# Patient Record
Sex: MALE | Race: WHITE | NOT HISPANIC OR LATINO
[De-identification: names, ages, dates, MRNs, and addresses within clinical notes are randomized per-mention and may not be internally consistent; named-entity substitution may affect disease eponyms.]

---

## 2022-12-29 ENCOUNTER — TRANSCRIPTION ENCOUNTER (OUTPATIENT)
Age: 52
End: 2022-12-29

## 2022-12-29 ENCOUNTER — APPOINTMENT (OUTPATIENT)
Dept: CARDIOLOGY | Facility: CLINIC | Age: 52
End: 2022-12-29

## 2022-12-29 VITALS
BODY MASS INDEX: 38.07 KG/M2 | SYSTOLIC BLOOD PRESSURE: 110 MMHG | RESPIRATION RATE: 16 BRPM | OXYGEN SATURATION: 98 % | DIASTOLIC BLOOD PRESSURE: 70 MMHG | WEIGHT: 223 LBS | HEIGHT: 64 IN | HEART RATE: 83 BPM

## 2022-12-29 DIAGNOSIS — Z78.9 OTHER SPECIFIED HEALTH STATUS: ICD-10-CM

## 2022-12-29 DIAGNOSIS — Z72.3 LACK OF PHYSICAL EXERCISE: ICD-10-CM

## 2022-12-29 DIAGNOSIS — Q04.6 CONGENITAL CEREBRAL CYSTS: ICD-10-CM

## 2022-12-29 DIAGNOSIS — Z82.49 FAMILY HISTORY OF ISCHEMIC HEART DISEASE AND OTHER DISEASES OF THE CIRCULATORY SYSTEM: ICD-10-CM

## 2022-12-29 PROCEDURE — 99204 OFFICE O/P NEW MOD 45 MIN: CPT | Mod: 25

## 2022-12-29 PROCEDURE — 93000 ELECTROCARDIOGRAM COMPLETE: CPT

## 2022-12-29 RX ORDER — MULTIVITAMIN
TABLET ORAL DAILY
Refills: 0 | Status: ACTIVE | COMMUNITY

## 2022-12-29 NOTE — ASSESSMENT
[FreeTextEntry1] : ECG: Normal sinus rhythm at 83 bpm.  No significant ST-T wave changes.\par \par Laboratory data:\par ---4/15/2022\par Chol---139\par HDL---27\par LDL---65\par Trigs---235\par S7x---1.6\par \par Impression\par 1.  Mixed hyperlipidemia, consistent with his metabolic syndrome.  Triglyceridemia control possibly inadequate.\par \par 2.  Hypertension of about 10 years duration seems adequately controlled\par \par 3.  Diabetes mellitus: Reasonably well-controlled\par \par 4.  Obstructive sleep apnea on CPAP fairly consistently but no pulmonary follow-up in quite some time\par \par 5.  Obesity with BMI 38\par \par Plan:\par 1.  Instructed the patient about the benefits of a diet that restricts portion sizes, increases frequency of meals and consists of  vegetables, (more green and leafy),fruit and nuts, whole grains, lean proteins and limits carbohydrates and meat and dairy fats\par \par 2.  The patient was instructed to follow a symptom limited regimen of moderate aerobic exercise for 30 minutes 3 to 4 days a week. A warm up and cool down period are to be added to the regimen and small incremental changes can be made every few weeks. Any new symptoms of exercise related chest pain or dyspnea should be reported.\par \par 3.  Echocardiogram to rule out structural and functional heart disease\par \par 4.  Exercise stress test to rule stratify\par \par 5.  CT calcium scoring to further assess and address risk.\par \par 6.  Pulmonary-sleep medicine follow-up.

## 2022-12-29 NOTE — REASON FOR VISIT
[FreeTextEntry1] : 52-year-old male presents here for cardiac evaluation with concerns about:\par 1.  Hypertension\par 2.  Mixed hyperlipidemia\par 3.  Morbid obesity\par 4.  Obstructive sleep apnea.\par \par Does not exercise regularly.\par Experiences no significant shortness of breath, chest pain, palpitations, PND, orthopnea, edema.\par \par He is a remote smoker.\par Denies diabetes.\par Equivocally positive family history Mom and father both  of cardiac etiologies but details are unclear.\par \par Uses his CPAP regularly.  Has not had any pulmonary sleep study follow-up.\par \par Admits to dietary indiscretions and difficulty with weight loss.\par \par Cardiac evaluation performed in 2016 was largely unremarkable.

## 2022-12-29 NOTE — PHYSICAL EXAM
[Well Nourished] : well nourished [No Acute Distress] : no acute distress [Obese] : obese [Soft] : abdomen soft [Non Tender] : non-tender [Normal] : alert and oriented, normal memory [de-identified] : Normocephalic/atraumatic

## 2023-01-06 ENCOUNTER — APPOINTMENT (OUTPATIENT)
Dept: CT IMAGING | Facility: CLINIC | Age: 53
End: 2023-01-06
Payer: SELF-PAY

## 2023-01-06 ENCOUNTER — OUTPATIENT (OUTPATIENT)
Dept: OUTPATIENT SERVICES | Facility: HOSPITAL | Age: 53
LOS: 1 days | End: 2023-01-06
Payer: SELF-PAY

## 2023-01-06 DIAGNOSIS — E66.9 OBESITY, UNSPECIFIED: ICD-10-CM

## 2023-01-06 PROCEDURE — 75571 CT HRT W/O DYE W/CA TEST: CPT | Mod: 26

## 2023-01-06 PROCEDURE — 75571 CT HRT W/O DYE W/CA TEST: CPT

## 2023-01-09 ENCOUNTER — TRANSCRIPTION ENCOUNTER (OUTPATIENT)
Age: 53
End: 2023-01-09

## 2023-01-09 ENCOUNTER — NON-APPOINTMENT (OUTPATIENT)
Age: 53
End: 2023-01-09

## 2023-01-26 ENCOUNTER — APPOINTMENT (OUTPATIENT)
Dept: PULMONOLOGY | Facility: CLINIC | Age: 53
End: 2023-01-26
Payer: COMMERCIAL

## 2023-01-26 VITALS
DIASTOLIC BLOOD PRESSURE: 80 MMHG | SYSTOLIC BLOOD PRESSURE: 120 MMHG | RESPIRATION RATE: 16 BRPM | HEART RATE: 86 BPM | OXYGEN SATURATION: 96 %

## 2023-01-26 VITALS — WEIGHT: 225 LBS | BODY MASS INDEX: 38.62 KG/M2

## 2023-01-26 PROCEDURE — 99204 OFFICE O/P NEW MOD 45 MIN: CPT

## 2023-01-26 NOTE — CONSULT LETTER
[Dear  ___] : Dear  [unfilled], [Consult Letter:] : I had the pleasure of evaluating your patient, [unfilled]. [Please see my note below.] : Please see my note below. [Consult Closing:] : Thank you very much for allowing me to participate in the care of this patient.  If you have any questions, please do not hesitate to contact me. [Sincerely,] : Sincerely, [FreeTextEntry3] : Luis Angel Ha MD FCCP\par Pulmonary/Critical Care/Sleep Medicine\par Department of Internal Medicine\par \par [facility] [employer] [facility]\par

## 2023-01-26 NOTE — HISTORY OF PRESENT ILLNESS
[Awakes Unrefreshed] : awakes unrefreshed [Awakes with Dry Mouth] : awakes with dry mouth [Awakes with Headache] : awakes with headache [Snoring] : snoring [Witnessed Apneas] : witnessed apneas [Lab] : lab [Full Face mask] : full face mask [Obstructive Sleep Apnea] : obstructive sleep apnea [TextBox_77] : 4832 [TextBox_79] : 9281 [TextBox_81] : 5 [TextBox_89] : 0 [TextBox_93] : Above without CPAP [TextBox_100] : 2/18/2016 [TextBox_108] : 16 (REM 38) [TextBox_120] : CPAP 9 [TextBox_104] : 3/29/2016 [TextBox_160] : [employer] Large [TextBox_165] : Machine greater than 5 years old and requires replacement\par Historically 100% compliant.  No compliance record is available [TextBox_162] : 2016 [ESS] : 9

## 2023-01-26 NOTE — END OF VISIT
[FreeTextEntry3] : Chart review [Time Spent: ___ minutes] : I have spent [unfilled] minutes of time on the encounter.

## 2023-01-26 NOTE — DISCUSSION/SUMMARY
[Obstructive Sleep Apnea] : obstructive sleep apnea [Moderate] : moderate in severity [None] : There are no changes in medication management [Alcohol Avoidance] : alcohol avoidance [Sedative Avoidance] : sedative avoidance [Weight Loss Program] : weight loss program [CPAP] : CPAP [de-identified] : Machine > 5yrs old. Needs replacement

## 2023-01-31 ENCOUNTER — APPOINTMENT (OUTPATIENT)
Dept: CARDIOLOGY | Facility: CLINIC | Age: 53
End: 2023-01-31
Payer: COMMERCIAL

## 2023-01-31 PROCEDURE — 93015 CV STRESS TEST SUPVJ I&R: CPT

## 2023-02-08 ENCOUNTER — APPOINTMENT (OUTPATIENT)
Dept: CARDIOLOGY | Facility: CLINIC | Age: 53
End: 2023-02-08
Payer: COMMERCIAL

## 2023-02-08 PROCEDURE — 93306 TTE W/DOPPLER COMPLETE: CPT

## 2023-02-08 RX ORDER — PERFLUTREN 6.52 MG/ML
6.52 INJECTION, SUSPENSION INTRAVENOUS
Qty: 2 | Refills: 0 | Status: COMPLETED | OUTPATIENT
Start: 2023-02-08

## 2023-02-08 RX ADMIN — PERFLUTREN MG/ML: 6.52 INJECTION, SUSPENSION INTRAVENOUS at 00:00

## 2023-02-09 ENCOUNTER — APPOINTMENT (OUTPATIENT)
Dept: CARDIOLOGY | Facility: CLINIC | Age: 53
End: 2023-02-09
Payer: COMMERCIAL

## 2023-02-09 VITALS
HEIGHT: 64 IN | DIASTOLIC BLOOD PRESSURE: 70 MMHG | BODY MASS INDEX: 38.41 KG/M2 | SYSTOLIC BLOOD PRESSURE: 112 MMHG | RESPIRATION RATE: 16 BRPM | WEIGHT: 225 LBS | OXYGEN SATURATION: 97 % | HEART RATE: 86 BPM

## 2023-02-09 PROCEDURE — 99214 OFFICE O/P EST MOD 30 MIN: CPT | Mod: 25

## 2023-02-09 PROCEDURE — 93000 ELECTROCARDIOGRAM COMPLETE: CPT

## 2023-02-09 NOTE — ASSESSMENT
[FreeTextEntry1] : Laboratory data:\par ------4/15/22---1/10/23\par Chol---139--------148\par HDL----27----------31\par LDL----65----------81\par Trigs---235-------180\par V7f---2.6\par Creat--------------1.39\par \par CT calcium score 1/6/2023:\par Total:-9 all in the LAD.  (64th percentile)\par \par Echocardiogram 2/8/2023:\par Mild concentric LVH with hyperdynamic function LVEF-65 to 70%\par No other significant abnormalities noted.\par \par Stress test 1/31/2023:\par Time: 7 minutes 30 seconds\par Heart rate: 166 bpm (99%)\par Blood pressure: 184/72\par ECG: No ischemia occasional PVCs\par Cantor score: 7 consistent with low risk\par \par Impression\par 1.  Mixed hyperlipidemia, consistent with his metabolic syndrome.  Triglyceridemia control possibly inadequate.\par \par 2.  Hypertension of about 10 years duration seems adequately controlled with borderline LVH on echo and normal systolic function\par \par 3.  Diabetes mellitus: Reasonably well-controlled\par \par 4.  Obstructive sleep apnea on CPAP fairly consistently\par \par 5.  Obesity with BMI 38\par \par 6.  Mildly diminished exercise tolerance with out any inducible ischemia.\par \par 7.  Elevated serum creatinine noted on recent lab work.\par \par 8.  Low total cardiac calcium score implications reviewed.\par \par Plan:\par 1.  Instructed the patient about the benefits of a diet that restricts portion sizes, increases frequency of meals and consists of  vegetables, (more green and leafy),fruit and nuts, whole grains, lean proteins and limits carbohydrates and meat and dairy fats\par \par 2.  The patient was instructed to follow a symptom limited regimen of moderate aerobic exercise for 30 minutes 3 to 4 days a week. A warm up and cool down period are to be added to the regimen and small incremental changes can be made every few weeks. Any new symptoms of exercise related chest pain or dyspnea should be reported.\par \par 3.  Follow-up metabolic panel lipids and A1c with primary care.\par

## 2023-02-09 NOTE — REASON FOR VISIT
[FreeTextEntry1] : 52-year-old male presents here for cardiac evaluation with concerns about:\par \par 1.  Hypertension\par 2.  Mixed hyperlipidemia\par 3.  Morbid obesity\par 4.  Obstructive sleep apnea.\par \par Does not exercise regularly.\par Experiences no significant shortness of breath, chest pain, palpitations, PND, orthopnea, edema.\par \par He is a remote smoker.\par Denies diabetes.\par Equivocally positive family history Mom and father both  of cardiac etiologies but details are unclear.\par \par Uses his CPAP regularly.  Has  had  pulmonary sleep study follow-up and is awaiting delivery of a new CPAP mask\par \par Admits to dietary indiscretions and difficulty with weight loss.\par \par Cardiac evaluation performed in  was largely unremarkable.

## 2023-02-09 NOTE — PHYSICAL EXAM
[Well Nourished] : well nourished [No Acute Distress] : no acute distress [Obese] : obese [Soft] : abdomen soft [Non Tender] : non-tender [Normal] : alert and oriented, normal memory [de-identified] : Normocephalic/atraumatic

## 2023-04-27 ENCOUNTER — APPOINTMENT (OUTPATIENT)
Dept: PULMONOLOGY | Facility: CLINIC | Age: 53
End: 2023-04-27
Payer: COMMERCIAL

## 2023-04-27 VITALS
HEIGHT: 64 IN | OXYGEN SATURATION: 98 % | WEIGHT: 220 LBS | HEART RATE: 78 BPM | SYSTOLIC BLOOD PRESSURE: 120 MMHG | DIASTOLIC BLOOD PRESSURE: 72 MMHG | BODY MASS INDEX: 37.56 KG/M2 | RESPIRATION RATE: 16 BRPM

## 2023-04-27 DIAGNOSIS — Z71.89 OTHER SPECIFIED COUNSELING: ICD-10-CM

## 2023-04-27 PROCEDURE — 99214 OFFICE O/P EST MOD 30 MIN: CPT

## 2023-04-27 NOTE — DISCUSSION/SUMMARY
[Obstructive Sleep Apnea] : obstructive sleep apnea [Moderate] : moderate in severity [None] : There are no changes in medication management [Alcohol Avoidance] : alcohol avoidance [Sedative Avoidance] : sedative avoidance [Weight Loss Program] : weight loss program [CPAP] : CPAP [Responding to Treatment] : responding to treatment [de-identified] : Patient compliant with CPAP/BiPAP and benefiting from therapy

## 2023-04-27 NOTE — END OF VISIT
[Time Spent: ___ minutes] : I have spent [unfilled] minutes of time on the encounter. [FreeTextEntry3] : Chart review

## 2023-04-27 NOTE — HISTORY OF PRESENT ILLNESS
[Obstructive Sleep Apnea] : obstructive sleep apnea [Lab] : lab [Full Face mask] : full face mask [Awakes Unrefreshed] : does not awaken unrefreshed [Awakes with Dry Mouth] : does not awaken with dry mouth [Awakes with Headache] : does not awaken with headache [Snoring] : no snoring [Witnessed Apneas] : no witnessed apneas [TextBox_77] : 7137 [TextBox_79] : 0630 [TextBox_81] : 5 [TextBox_89] : 0 [TextBox_93] : Above without CPAP [TextBox_100] : 2/18/2016 [TextBox_108] : 16 (REM 38) [TextBox_120] : CPAP 9 [TextBox_104] : 3/29/2016 [TextBox_127] : 3/28/23 [TextBox_133] : 93 [TextBox_129] : 4/26/2023 [TextBox_137] : 90 [TextBox_141] : 7 [TextBox_143] : 12 [TextBox_147] : 1.7 [TextBox_158] : [employer] [TextBox_160] : F20 Large [TextBox_162] : 1/26/2023 [TextBox_165] : P95%: 14.7 cm H20 [ESS] : 3

## 2023-04-27 NOTE — CONSULT LETTER
[Dear  ___] : Dear  [unfilled], [Consult Letter:] : I had the pleasure of evaluating your patient, [unfilled]. [Please see my note below.] : Please see my note below. [Consult Closing:] : Thank you very much for allowing me to participate in the care of this patient.  If you have any questions, please do not hesitate to contact me. [Sincerely,] : Sincerely, [FreeTextEntry3] : Luis Angel Ha MD FCCP\par Pulmonary/Critical Care/Sleep Medicine\par Department of Internal Medicine\par \par [facility] [employer] [facility]\par

## 2023-08-08 ENCOUNTER — APPOINTMENT (OUTPATIENT)
Dept: CARDIOLOGY | Facility: CLINIC | Age: 53
End: 2023-08-08
Payer: COMMERCIAL

## 2023-08-08 VITALS
OXYGEN SATURATION: 97 % | HEART RATE: 70 BPM | DIASTOLIC BLOOD PRESSURE: 78 MMHG | BODY MASS INDEX: 39.09 KG/M2 | HEIGHT: 64 IN | WEIGHT: 229 LBS | RESPIRATION RATE: 16 BRPM | SYSTOLIC BLOOD PRESSURE: 120 MMHG

## 2023-08-08 DIAGNOSIS — R79.89 OTHER SPECIFIED ABNORMAL FINDINGS OF BLOOD CHEMISTRY: ICD-10-CM

## 2023-08-08 PROCEDURE — 99214 OFFICE O/P EST MOD 30 MIN: CPT | Mod: 25

## 2023-08-08 PROCEDURE — 93000 ELECTROCARDIOGRAM COMPLETE: CPT

## 2023-08-08 RX ORDER — FENOFIBRATE 145 MG/1
145 TABLET, COATED ORAL DAILY
Qty: 90 | Refills: 3 | Status: ACTIVE | COMMUNITY
Start: 1900-01-01 | End: 1900-01-01

## 2023-08-08 NOTE — PHYSICAL EXAM
[Well Nourished] : well nourished [No Acute Distress] : no acute distress [Obese] : obese [Soft] : abdomen soft [Non Tender] : non-tender [Normal] : alert and oriented, normal memory [de-identified] : Normocephalic/atraumatic

## 2023-08-08 NOTE — ASSESSMENT
[FreeTextEntry1] : ECG: Normal sinus rhythm at 70 bpm.  Normal axis and intervals no significant ST or T wave changes  Laboratory data: ------4/15/22---1/10/23--5/2/23 Chol---139--------148-----126 HDL----27----------31------29 LDL----65----------81------49 Trigs---235-------180-----239 O1l---5.6 Creat--------------1.39----1.21  CT calcium score 1/6/2023: Total:-9 all in the LAD.  (64th percentile)  Echocardiogram 2/8/2023: Mild concentric LVH with hyperdynamic function LVEF-65 to 70% No other significant abnormalities noted.  Stress test 1/31/2023: Time: 7 minutes 30 seconds Heart rate: 166 bpm (99%) Blood pressure: 184/72 ECG: No ischemia occasional PVCs Cantor score: 7 consistent with low risk  Impression 1.  Mixed hyperlipidemia, consistent with his metabolic syndrome.  Triglyceridemia control possibly inadequate.  2.  Hypertension of about 10 years duration seems adequately controlled with borderline LVH on echo and normal systolic function  3.  Diabetes mellitus: Reasonably well-controlled  4.  Obstructive sleep apnea using new CPAP fairly consistently  5.  Obesity with increase of 9 pounds with a BMI of 39.3  6.  Reportedly with improvement in exercise tolerance and diminished exertional dyspnea  7.  Serum creatinine is stable  8.  Low total cardiac calcium score implications reviewed.  Plan: 1.  Instructed the patient about the benefits of a diet that restricts portion sizes, increases frequency of meals and consists of  vegetables, (more green and leafy),fruit and nuts, whole grains, lean proteins and limits carbohydrates and meat and dairy fats  2.  The patient was instructed to follow a symptom limited regimen of moderate aerobic exercise for 30 minutes 3 to 4 days a week. A warm up and cool down period are to be added to the regimen and small incremental changes can be made every few weeks. Any new symptoms of exercise related chest pain or dyspnea should be reported.  3.  Follow-up metabolic panel lipids and A1c with primary care.

## 2023-08-08 NOTE — REASON FOR VISIT
[FreeTextEntry1] : 53-year-old male presents here for cardiac evaluation with concerns about:  1.  Hypertension 2.  Mixed hyperlipidemia 3.  Morbid obesity 4.  Obstructive sleep apnea.  Is now exercising regularly both resistance and aerobically and feeling much improved Experiences no significant shortness of breath, chest pain, palpitations, PND, orthopnea, edema.  He is a remote smoker. Denies diabetes. Equivocally positive family history Mom and father both  of cardiac etiologies but details are unclear.  Uses his CPAP regularly.  Has  had  pulmonary sleep study follow-up and is awaiting delivery of a new CPAP mask  Diet is improving but still has dietary indiscretions. Has not lost weight but because of his hardcore resistance workouts he is probably gaining muscle weight and losing fat weight  Cardiac evaluation performed in 2016 was largely unremarkable.

## 2024-02-06 ENCOUNTER — APPOINTMENT (OUTPATIENT)
Dept: CARDIOLOGY | Facility: CLINIC | Age: 54
End: 2024-02-06
Payer: COMMERCIAL

## 2024-02-06 ENCOUNTER — NON-APPOINTMENT (OUTPATIENT)
Age: 54
End: 2024-02-06

## 2024-02-06 VITALS
BODY MASS INDEX: 38.58 KG/M2 | HEIGHT: 64 IN | OXYGEN SATURATION: 98 % | WEIGHT: 226 LBS | DIASTOLIC BLOOD PRESSURE: 70 MMHG | HEART RATE: 75 BPM | SYSTOLIC BLOOD PRESSURE: 110 MMHG | RESPIRATION RATE: 16 BRPM

## 2024-02-06 DIAGNOSIS — E66.9 OBESITY, UNSPECIFIED: ICD-10-CM

## 2024-02-06 DIAGNOSIS — E78.5 HYPERLIPIDEMIA, UNSPECIFIED: ICD-10-CM

## 2024-02-06 DIAGNOSIS — G47.33 OBSTRUCTIVE SLEEP APNEA (ADULT) (PEDIATRIC): ICD-10-CM

## 2024-02-06 DIAGNOSIS — E78.1 PURE HYPERGLYCERIDEMIA: ICD-10-CM

## 2024-02-06 DIAGNOSIS — I10 ESSENTIAL (PRIMARY) HYPERTENSION: ICD-10-CM

## 2024-02-06 PROCEDURE — 99214 OFFICE O/P EST MOD 30 MIN: CPT

## 2024-02-06 PROCEDURE — G2211 COMPLEX E/M VISIT ADD ON: CPT

## 2024-02-06 PROCEDURE — 93000 ELECTROCARDIOGRAM COMPLETE: CPT

## 2024-02-06 NOTE — REASON FOR VISIT
[FreeTextEntry1] : 53-year-old male presents here for cardiac evaluation with concerns about:  1.  Hypertension 2.  Mixed hyperlipidemia 3.  Morbid obesity 4.  Obstructive sleep apnea.  Is now exercising regularly both resistance and aerobically and feeling much improved Experiences no significant shortness of breath, chest pain, palpitations, PND, orthopnea, edema.  He is a remote smoker. Denies diabetes. Equivocally positive family history Mom and father both  of cardiac etiologies but details are unclear.  Uses his CPAP regularly.  Has had pulmonary sleep study follow-up and is awaiting delivery of a new CPAP mask  Diet is improving but still has dietary indiscretions. Has not lost weight but because of his hardcore resistance workouts he is probably gaining muscle weight and losing fat weight

## 2024-02-06 NOTE — PHYSICAL EXAM
[Well Nourished] : well nourished [No Acute Distress] : no acute distress [Obese] : obese [Soft] : abdomen soft [Non Tender] : non-tender [Normal] : alert and oriented, normal memory [de-identified] : Normocephalic/atraumatic

## 2024-02-06 NOTE — ASSESSMENT
[FreeTextEntry1] : ECG: Normal sinus rhythm at 75 bpm.  Normal axis and intervals no significant ST or T wave changes.  Laboratory data: ------4/15/22---1/10/23--5/2/23--9/28/23 Chol---139--------148-----126------130 HDL----27----------31------29---------30 LDL----65----------81------49----------69 Trigs---235-------180-----239--------186 H0y---4.6 Creat--------------1.39----1.21  CT calcium score 1/6/2023: Total:-9 all in the LAD.  (64th percentile)  Echocardiogram 2/8/2023: Mild concentric LVH with hyperdynamic function LVEF-65 to 70% No other significant abnormalities noted.  Stress test 1/31/2023: Time: 7 minutes 30 seconds Heart rate: 166 bpm (99%) Blood pressure: 184/72 ECG: No ischemia occasional PVCs Cantor score: 7 consistent with low risk  Impression 1.  Mixed hyperlipidemia, consistent with his metabolic syndrome.  Chol well-controlled and hypertriglyceridemia improved.  2.  Hypertension of about 10 years duration seems adequately controlled with borderline LVH on echo and normal systolic function.  3.  Diabetes mellitus: Reasonably well-controlled.  4.  Obstructive sleep apnea using new CPAP fairly consistently.  5.  Obesity BMI 38.8 relatively unchanged.  6.  Reportedly with improvement in exercise tolerance and diminished exertional dyspnea.  7.  Serum creatinine is stable.  8.  Low total cardiac calcium score implications reviewed.  Plan: 1.  Instructed the patient about the benefits of a diet that restricts portion sizes, increases frequency of meals and consists of  vegetables, (more green and leafy),fruit and nuts, whole grains, lean proteins and limits carbohydrates and meat and dairy fats.  2.  The patient was instructed to follow a symptom limited regimen of moderate aerobic exercise for 30 minutes 3 to 4 days a week. A warm up and cool down period are to be added to the regimen and small incremental changes can be made every few weeks. Any new symptoms of exercise related chest pain or dyspnea should be reported. We discussed the benefit of interval training in an effort to maximize the time he has at the gym.  3.  Follow-up metabolic panel lipids and A1c with primary care.

## 2024-08-27 ENCOUNTER — NON-APPOINTMENT (OUTPATIENT)
Age: 54
End: 2024-08-27

## 2024-08-28 ENCOUNTER — APPOINTMENT (OUTPATIENT)
Dept: CARDIOLOGY | Facility: CLINIC | Age: 54
End: 2024-08-28
Payer: COMMERCIAL

## 2024-08-28 VITALS
BODY MASS INDEX: 38.41 KG/M2 | OXYGEN SATURATION: 98 % | WEIGHT: 225 LBS | DIASTOLIC BLOOD PRESSURE: 60 MMHG | RESPIRATION RATE: 16 BRPM | HEART RATE: 82 BPM | HEIGHT: 64 IN | SYSTOLIC BLOOD PRESSURE: 112 MMHG

## 2024-08-28 DIAGNOSIS — Z71.89 OTHER SPECIFIED COUNSELING: ICD-10-CM

## 2024-08-28 DIAGNOSIS — G47.33 OBSTRUCTIVE SLEEP APNEA (ADULT) (PEDIATRIC): ICD-10-CM

## 2024-08-28 DIAGNOSIS — I10 ESSENTIAL (PRIMARY) HYPERTENSION: ICD-10-CM

## 2024-08-28 DIAGNOSIS — R79.89 OTHER SPECIFIED ABNORMAL FINDINGS OF BLOOD CHEMISTRY: ICD-10-CM

## 2024-08-28 DIAGNOSIS — E78.1 PURE HYPERGLYCERIDEMIA: ICD-10-CM

## 2024-08-28 DIAGNOSIS — E78.5 HYPERLIPIDEMIA, UNSPECIFIED: ICD-10-CM

## 2024-08-28 PROCEDURE — 99214 OFFICE O/P EST MOD 30 MIN: CPT

## 2024-08-28 PROCEDURE — 93000 ELECTROCARDIOGRAM COMPLETE: CPT

## 2024-08-28 PROCEDURE — G2211 COMPLEX E/M VISIT ADD ON: CPT | Mod: NC

## 2024-08-28 NOTE — ASSESSMENT
[FreeTextEntry1] : ECG: Normal sinus rhythm at 82 bpm.  Normal axis and intervals no significant ST or T wave changes.  ECG obtained to assist in diagnosis and management of assessed problem(s).   Laboratory data: ------4/15/22---1/10/23--5/2/23--9/28/23--8/20/24 Chol---139--------148-----126------130------129 HDL----27----------31------29---------30-------28 LDL----65----------81------49----------69------72 Trigs---235-------180-----239--------186-----166 I2e---3.6 Creat--------------1.39----1.21-----------------1.36  CT calcium score 1/6/2023: Total:-9 all in the LAD.  (64th percentile)  Echocardiogram 2/8/2023: Mild concentric LVH with hyperdynamic function LVEF-65 to 70% No other significant abnormalities noted.  Stress test 1/31/2023: Time: 7 minutes 30 seconds Heart rate: 166 bpm (99%) Blood pressure: 184/72 ECG: No ischemia occasional PVCs Cantor score: 7 consistent with low risk  Impression 1.  Mixed hyperlipidemia, consistent with his metabolic syndrome.  Chol well-controlled and hypertriglyceridemia improved.  2.  Hypertension of about 10 years duration seems adequately controlled with borderline LVH on echo and normal systolic function.  3.  Diabetes mellitus: Reasonably well-controlled.  4.  Obstructive sleep apnea using new CPAP fairly consistently.  5.  Obesity BMI 38.8 relatively unchanged.  6.  Diminished exercise due to his job related obligations.  7.  Serum creatinine is stable.  8.  Low total cardiac calcium score implications reviewed.  Plan: 1.  Instructed the patient about the benefits of a diet that restricts portion sizes, increases frequency of meals and consists of  vegetables, (more green and leafy),fruit and nuts, whole grains, lean proteins and limits carbohydrates and meat and dairy fats.  2.  The patient was instructed to follow a symptom limited regimen of moderate aerobic exercise for 30 minutes 3 to 4 days a week. A warm up and cool down period are to be added to the regimen and small incremental changes can be made every few weeks. Any new symptoms of exercise related chest pain or dyspnea should be reported. We discussed the benefit of interval training in an effort to maximize the time he has at the gym.  3.  Follow-up metabolic panel lipids and A1c with primary care.   4.  Clinical follow-up here in 6 months.

## 2024-08-28 NOTE — PHYSICAL EXAM
[Well Nourished] : well nourished [No Acute Distress] : no acute distress [Obese] : obese [Soft] : abdomen soft [Non Tender] : non-tender [Normal] : alert and oriented, normal memory [de-identified] : Normocephalic/atraumatic

## 2024-08-28 NOTE — REASON FOR VISIT
[FreeTextEntry1] : 54-year-old male presents here for cardiac evaluation with concerns about:  1.  Hypertension 2.  Mixed hyperlipidemia 3.  Morbid obesity 4.  Obstructive sleep apnea.  Exercising far less frequently recently. Experiences no significant shortness of breath, chest pain, palpitations, PND, orthopnea, edema.  He is a remote smoker. Denies diabetes. Equivocally positive family history Mom and father both  of cardiac etiologies but details are unclear.  Uses his CPAP regularly.  Diet is improving but still has dietary indiscretions. No recent weight loss.

## 2024-09-16 ENCOUNTER — RX RENEWAL (OUTPATIENT)
Age: 54
End: 2024-09-16

## 2025-02-20 ENCOUNTER — APPOINTMENT (OUTPATIENT)
Dept: CARDIOLOGY | Facility: CLINIC | Age: 55
End: 2025-02-20
Payer: COMMERCIAL

## 2025-02-20 VITALS
HEART RATE: 82 BPM | WEIGHT: 225 LBS | DIASTOLIC BLOOD PRESSURE: 70 MMHG | OXYGEN SATURATION: 98 % | BODY MASS INDEX: 38.41 KG/M2 | RESPIRATION RATE: 16 BRPM | SYSTOLIC BLOOD PRESSURE: 110 MMHG | HEIGHT: 64 IN

## 2025-02-20 DIAGNOSIS — R79.89 OTHER SPECIFIED ABNORMAL FINDINGS OF BLOOD CHEMISTRY: ICD-10-CM

## 2025-02-20 DIAGNOSIS — I10 ESSENTIAL (PRIMARY) HYPERTENSION: ICD-10-CM

## 2025-02-20 DIAGNOSIS — Z71.89 OTHER SPECIFIED COUNSELING: ICD-10-CM

## 2025-02-20 DIAGNOSIS — E78.5 HYPERLIPIDEMIA, UNSPECIFIED: ICD-10-CM

## 2025-02-20 DIAGNOSIS — E78.1 PURE HYPERGLYCERIDEMIA: ICD-10-CM

## 2025-02-20 DIAGNOSIS — G47.33 OBSTRUCTIVE SLEEP APNEA (ADULT) (PEDIATRIC): ICD-10-CM

## 2025-02-20 DIAGNOSIS — E66.812 OBESITY, CLASS 2: ICD-10-CM

## 2025-02-20 PROCEDURE — 99214 OFFICE O/P EST MOD 30 MIN: CPT

## 2025-02-20 PROCEDURE — G2211 COMPLEX E/M VISIT ADD ON: CPT | Mod: NC

## 2025-02-20 PROCEDURE — 93000 ELECTROCARDIOGRAM COMPLETE: CPT

## 2025-08-19 ENCOUNTER — APPOINTMENT (OUTPATIENT)
Dept: CARDIOLOGY | Facility: CLINIC | Age: 55
End: 2025-08-19

## 2025-08-27 ENCOUNTER — APPOINTMENT (OUTPATIENT)
Dept: CARDIOLOGY | Facility: CLINIC | Age: 55
End: 2025-08-27
Payer: COMMERCIAL

## 2025-08-27 VITALS
WEIGHT: 233 LBS | SYSTOLIC BLOOD PRESSURE: 127 MMHG | HEART RATE: 79 BPM | OXYGEN SATURATION: 98 % | RESPIRATION RATE: 16 BRPM | BODY MASS INDEX: 39.78 KG/M2 | HEIGHT: 64 IN | DIASTOLIC BLOOD PRESSURE: 75 MMHG

## 2025-08-27 DIAGNOSIS — E78.1 PURE HYPERGLYCERIDEMIA: ICD-10-CM

## 2025-08-27 DIAGNOSIS — G47.33 OBSTRUCTIVE SLEEP APNEA (ADULT) (PEDIATRIC): ICD-10-CM

## 2025-08-27 DIAGNOSIS — E66.812 OBESITY, CLASS 2: ICD-10-CM

## 2025-08-27 DIAGNOSIS — E78.5 HYPERLIPIDEMIA, UNSPECIFIED: ICD-10-CM

## 2025-08-27 DIAGNOSIS — I10 ESSENTIAL (PRIMARY) HYPERTENSION: ICD-10-CM

## 2025-08-27 PROCEDURE — 99214 OFFICE O/P EST MOD 30 MIN: CPT

## 2025-08-27 PROCEDURE — G2211 COMPLEX E/M VISIT ADD ON: CPT | Mod: NC

## 2025-08-27 PROCEDURE — 93000 ELECTROCARDIOGRAM COMPLETE: CPT

## 2025-09-09 ENCOUNTER — APPOINTMENT (OUTPATIENT)
Dept: PULMONOLOGY | Facility: CLINIC | Age: 55
End: 2025-09-09
Payer: COMMERCIAL

## 2025-09-09 VITALS
WEIGHT: 230 LBS | BODY MASS INDEX: 39.27 KG/M2 | RESPIRATION RATE: 16 BRPM | SYSTOLIC BLOOD PRESSURE: 122 MMHG | HEART RATE: 71 BPM | OXYGEN SATURATION: 97 % | DIASTOLIC BLOOD PRESSURE: 72 MMHG | HEIGHT: 64 IN

## 2025-09-09 DIAGNOSIS — G47.33 OBSTRUCTIVE SLEEP APNEA (ADULT) (PEDIATRIC): ICD-10-CM

## 2025-09-09 DIAGNOSIS — Z71.89 OTHER SPECIFIED COUNSELING: ICD-10-CM

## 2025-09-09 PROCEDURE — 99215 OFFICE O/P EST HI 40 MIN: CPT

## 2025-09-09 PROCEDURE — G2211 COMPLEX E/M VISIT ADD ON: CPT | Mod: NC
